# Patient Record
Sex: FEMALE | Race: BLACK OR AFRICAN AMERICAN | Employment: OTHER | URBAN - METROPOLITAN AREA
[De-identification: names, ages, dates, MRNs, and addresses within clinical notes are randomized per-mention and may not be internally consistent; named-entity substitution may affect disease eponyms.]

---

## 2017-04-27 ENCOUNTER — PATIENT OUTREACH (OUTPATIENT)
Dept: CASE MANAGEMENT | Age: 82
End: 2017-04-27

## 2017-04-27 NOTE — PROGRESS NOTES
Attempted outreach to begin CCM assessment due to direct referral from PCP. Spoke with Christopher Melton's wife. She gave me work and cell numbers. Left messages at both numbers. Cell number is [phone]. Will attempt outreach again on 4/28/17. This note will not be viewable in [address]

## 2017-05-02 ENCOUNTER — PATIENT OUTREACH (OUTPATIENT)
Dept: CASE MANAGEMENT | Age: 82
End: 2017-05-02

## 2017-05-02 NOTE — PROGRESS NOTES
Complex Case Management Initial Assessment    Date/Time of Call:   5/2/17 12:30pm   Referral source? Referral reason? (if known) PCP    Family needing advice on care assistance/resources for in home care           Recent hospitalization/ED visit?  (If so, multiple ED or hospitalizations in past 12 months?)     Last Hospital admit was at [facility] and was discharged home on 4/7/17   Fall Risk Screening:   Falls in past 12 months? If yes  were injuries incurred? (Complete falls risk screening tool in Nurse Navigator Template in [address] [facility])     No falls   Ambulatory? Current use of DME for ambulation? Does the patient still drive? Who provides transportation? No     Wheelchair     No    Was transported by EMS to last PCP appointment   Are home services ordered (nursing, PT, OT, ST, etc.)? Assist with coordination if necessary. Does have [facility] and [facility]       DME needs present? Assist with coordination if necessary. None at this time   Does the patient have any problems in performing ADLs? (If patient is unable to perform ADLs, what is the limiting factor(s)? Does he/she have a support system that can assist? If no support system is present, discuss possible assistance that they may be able to obtain.)     Yes  Family, caregivers provide all assistance with care as needed   Social needs present? Support system? Transportation issues? Financial issues in obtaining meds &/or healthcare? Nutritional needs met? Ulysses Villegas, son, information and phone numbers on Alzheimers association and [place] on aging. Family is needing sitters at home to provide family caregivers respite. Appetite is very poor and family supplements diet with boost supplement. Medication Reconciliation    Issues/side effects? Able to obtain all prescribed meds? Understand all medication dosing instructions?     Understand what meds are for and why they are taking them? Date Completed:  5/2/17    Unable to give meds as prescribed at times due to excessive sleepiness. PCP did address this at last appointment. Diabetic patient? If yes, most recent HgA1C result? Is DM well-controlled? Education on medications needed? Nutrition consult warranted? Annual foot and eye exam scheduled or already completed? No    Diagnosis of hypertension? If yes, medication prescribed? BP well-controlled? Education on medication needed? Nutrition consult warranted? Daily or routine BPs ordered? If yes, does patient have a BP cuff at home and is keeping a log of BPs? No   Health Maintenance Due? ( to check [address] [place] [address] for health maintenance due items and assist in coordination and scheduling as appropriate)  a. Colorectal cancer screening status?  b. Flu vaccine?  c. Pneumonia vaccine?  d. Mammogram (if applicable)       Was scheduled for annual wellness visit on 4/26/17 but unable to do:  Ms. Luz Cornelius is a 80 y.o. female . Accompanied by her daughter and transported here by EMS on a stretcher. She continues to decline and somewhat more rapidly. Was originally scheduled for a wellness visit today but this was canceled due to her poor condition. Now sleeping most of the time. Less problems with agitation. Trouble getting her medicines in. Depression Screening Completed  (Complete the depression screening tool in the Nurse Navigator template in [address] [place] [address]. RN to complete PHQ-2 and PHQ-9 as indicated. Unable to perform due to alzheimers and assessment was performed with son, Mitra Jasso. Does the patient have all follow-up appointments scheduled? Has transportation been arranged? Yes and family arranges transportation needs           Any other questions or concerns expressed by the patient?        Needing caregiving assistance in the home and numbers and information provided   Schedule next appointment with RN Case Manager/Social Services  as appropriate.    Will follow up with Padmini Castle on Friday 5/5/17 to see what information was obtained through alzheimers association and [employer] on aging   CCM outreach Completed By:   Haley Jovel RN CCM

## 2017-05-05 ENCOUNTER — PATIENT OUTREACH (OUTPATIENT)
Dept: CASE MANAGEMENT | Age: 82
End: 2017-05-05

## 2017-05-05 NOTE — PROGRESS NOTES
Attempted outreach to Haley Nishi to follow up with resources i gave to family. Will attempt outreach again on 5/8/17 to follow up. This note will not be viewable in [address]

## 2017-09-14 ENCOUNTER — PATIENT OUTREACH (OUTPATIENT)
Dept: CASE MANAGEMENT | Age: 82
End: 2017-09-14

## 2017-09-14 NOTE — PROGRESS NOTES
Case closed at this time due to patient admitted to hospice care of [place]. This note will not be viewable in [address]